# Patient Record
Sex: FEMALE | Race: WHITE | NOT HISPANIC OR LATINO
[De-identification: names, ages, dates, MRNs, and addresses within clinical notes are randomized per-mention and may not be internally consistent; named-entity substitution may affect disease eponyms.]

---

## 2024-01-26 ENCOUNTER — NON-APPOINTMENT (OUTPATIENT)
Age: 64
End: 2024-01-26

## 2024-01-31 ENCOUNTER — NON-APPOINTMENT (OUTPATIENT)
Age: 64
End: 2024-01-31

## 2024-02-01 ENCOUNTER — APPOINTMENT (OUTPATIENT)
Dept: ORTHOPEDIC SURGERY | Facility: CLINIC | Age: 64
End: 2024-02-01
Payer: MEDICARE

## 2024-02-01 VITALS
HEART RATE: 88 BPM | DIASTOLIC BLOOD PRESSURE: 79 MMHG | HEIGHT: 59 IN | WEIGHT: 168 LBS | SYSTOLIC BLOOD PRESSURE: 168 MMHG | BODY MASS INDEX: 33.87 KG/M2

## 2024-02-01 DIAGNOSIS — M72.0 PALMAR FASCIAL FIBROMATOSIS [DUPUYTREN]: ICD-10-CM

## 2024-02-01 PROCEDURE — 99202 OFFICE O/P NEW SF 15 MIN: CPT

## 2024-02-03 RX ORDER — AZITHROMYCIN 250 MG/1
250 TABLET, FILM COATED ORAL
Qty: 6 | Refills: 0 | Status: COMPLETED | COMMUNITY
Start: 2023-12-11

## 2024-02-03 RX ORDER — AMLODIPINE AND OLMESARTAN MEDOXOMIL 10; 40 MG/1; MG/1
10-40 TABLET ORAL
Qty: 90 | Refills: 0 | Status: ACTIVE | COMMUNITY
Start: 2023-09-22

## 2024-02-03 RX ORDER — SODIUM SULFATE, POTASSIUM SULFATE AND MAGNESIUM SULFATE 1.6; 3.13; 17.5 G/177ML; G/177ML; G/177ML
17.5-3.13-1.6 SOLUTION ORAL
Qty: 354 | Refills: 0 | Status: ACTIVE | COMMUNITY
Start: 2024-01-04

## 2024-02-03 RX ORDER — AMOXICILLIN AND CLAVULANATE POTASSIUM 875; 125 MG/1; MG/1
875-125 TABLET, COATED ORAL
Qty: 20 | Refills: 0 | Status: COMPLETED | COMMUNITY
Start: 2023-12-12

## 2024-02-03 NOTE — HISTORY OF PRESENT ILLNESS
[FreeTextEntry1] : The patient is 64 yo LHD female who presents for hand evaluation. The patient reports a nodule in left palm. When the patient  steering wheel tightly or another object presses on the nodule the patient has discomfort. Patient not aware of other Dupuytren's disease. Patient not aware of triggering. Patient occasionally awaken possibly 2 times per month with numbness in fingers.

## 2024-02-03 NOTE — PHYSICAL EXAM
[de-identified] : Left wrist: Good motion no localizing findings.   Left hand: Dupuytren's nodule between mid and distal palmar creases third ray Associated Dupuytren's cord proximally. No contracture; MP 3 hyperextends 10 degrees. Small Dupuytren's nodule fourth ray DPC with minimal cord.  Minimal first rib cord.  No other Dupuytren's disease visible left hand. No pertinent CMC, MP, PIP, or DIP joint contributory finding. No dorsal knuckle pads No A1 pulley tenderness and no triggering in any finger.  Right wrist: Good motion, no pain.  No tenderness. Basal joint manipulation minimal crepitus no pain. Right hand Tiny Dupuytren's nodule distal palmar crease fourth ray. Barely palpable cord. Even smaller Dupuytren's nodule third and fifth rays without cord or contractures. No pertinent MP, PIP, or DIP joint contributory finding. No A1 pulley tenderness and no triggering in any finger.  Neurologic: Median, ulnar, and radial motor and sensory are intact.  Phalen's test: negative. Phalen's test with median nerve compression: negative. Skin: No cyanosis, clubbing, edema or rashes. Vascular: Radial pulses intact. Lymphatic: No streaking or epitrochlear adenopathy. The patient is awake, alert, and oriented. Affect appropriate. Cooperative.

## 2024-02-03 NOTE — ASSESSMENT
[FreeTextEntry1] : Patient has Dupuytren's nodule and cord left palm without contracture.  Patient unaware of other small nodules and cords in both hands but no contractures.  No dorsal knuckle pads. Patient has no triggering.  Patient states that she has occasional discomfort when she  the left hand tightly against steering wheel or other objects.  Is possible that the Dupuytren's nodule is pressing deeply in the palm possibly flexor tendon and is causing discomfort, but there is no other notable problems identified.  I have explained the above to patient who understands and accepts.  No treatment is indicated or recommended other than patient understanding the nature of the problem and trying to adapt, which she states she is willing to do.  No other notable hand problems. Prognosis uncertain. Return as needed. A lengthy and detailed discussion was held with the patient regarding analysis, treatment, and recommendations. All questions have been answered. At the conclusion the patient expressed acceptance, understanding and agreement with the plan.

## 2024-02-03 NOTE — CONSULT LETTER
[Dear  ___] : Dear  [unfilled], [Consult Letter:] : I had the pleasure of evaluating your patient, [unfilled]. [FreeTextEntry1] : The patient was seen in hand consultation today. A copy of my office note is enclosed for your review with the patient's knowledge and consent.  Sincerely,  Raymundo Gibson MD Chief, Hand Surgery Residency  ([id number]) Department of Orthopaedic Surgery  [facility] Professor of Orthopaedic Surgery Brittanie MCCRACKEN at [facility]